# Patient Record
Sex: FEMALE | Race: WHITE | Employment: FULL TIME | URBAN - METROPOLITAN AREA
[De-identification: names, ages, dates, MRNs, and addresses within clinical notes are randomized per-mention and may not be internally consistent; named-entity substitution may affect disease eponyms.]

---

## 2018-01-02 PROBLEM — O41.00X0 OLIGOHYDRAMNIOS: Status: ACTIVE | Noted: 2018-01-02

## 2022-03-19 PROBLEM — O41.00X0 OLIGOHYDRAMNIOS: Status: ACTIVE | Noted: 2018-01-02

## 2022-12-16 ENCOUNTER — OFFICE VISIT (OUTPATIENT)
Dept: FAMILY MEDICINE CLINIC | Age: 27
End: 2022-12-16
Payer: COMMERCIAL

## 2022-12-16 VITALS
HEIGHT: 62 IN | WEIGHT: 133 LBS | SYSTOLIC BLOOD PRESSURE: 111 MMHG | BODY MASS INDEX: 24.48 KG/M2 | RESPIRATION RATE: 18 BRPM | DIASTOLIC BLOOD PRESSURE: 69 MMHG | TEMPERATURE: 97.4 F | HEART RATE: 74 BPM | OXYGEN SATURATION: 98 %

## 2022-12-16 DIAGNOSIS — Z00.00 WELL WOMAN EXAM (NO GYNECOLOGICAL EXAM): Primary | ICD-10-CM

## 2022-12-16 DIAGNOSIS — Z11.59 NEED FOR HEPATITIS C SCREENING TEST: ICD-10-CM

## 2022-12-16 NOTE — PATIENT INSTRUCTIONS
Vaccine Information Statement    Influenza (Flu) Vaccine (Inactivated or Recombinant): What You Need to Know    Many vaccine information statements are available in Polish and other languages. See www.immunize.org/vis. Hojas de información sobre vacunas están disponibles en español y en muchos otros idiomas. Visite www.immunize.org/vis. 1. Why get vaccinated? Influenza vaccine can prevent influenza (flu). Flu is a contagious disease that spreads around the [facility] every year, usually between October and May. Anyone can get the flu, but it is more dangerous for some people. Infants and young children, people 72 years and older, pregnant people, and people with certain health conditions or a weakened immune system are at greatest risk of flu complications. Pneumonia, bronchitis, sinus infections, and ear infections are examples of flu-related complications. If you have a medical condition, such as heart disease, cancer, or diabetes, flu can make it worse. Flu can cause fever and chills, sore throat, muscle aches, fatigue, cough, headache, and runny or stuffy nose. Some people may have vomiting and diarrhea, though this is more common in children than adults. In an average year, thousands of people in the [facility] die from flu, and many more are hospitalized. Flu vaccine prevents millions of illnesses and flu-related visits to the doctor each year. 2. Influenza vaccines     CDC recommends everyone 6 months and older get vaccinated every flu season. Children 6 months through 6years of age may need 2 doses during a single flu season. Everyone else needs only 1 dose each flu season. It takes about 2 weeks for protection to develop after vaccination. There are many flu viruses, and they are always changing. Each year a new flu vaccine is made to protect against the influenza viruses believed to be likely to cause disease in the upcoming flu season.  Even when the vaccine doesnt exactly match these viruses, it may still provide some protection. Influenza vaccine does not cause flu. Influenza vaccine may be given at the same time as other vaccines. 3. Talk with your health care provider    Tell your vaccination provider if the person getting the vaccine:  Has had an allergic reaction after a previous dose of influenza vaccine, or has any severe, life-threatening allergies   Has ever had Guillain-Barré Syndrome (also called GBS)    In some cases, your health care provider may decide to postpone influenza vaccination until a future visit. Influenza vaccine can be administered at any time during pregnancy. People who are or will be pregnant during influenza season should receive inactivated influenza vaccine. People with minor illnesses, such as a cold, may be vaccinated. People who are moderately or severely ill should usually wait until they recover before getting influenza vaccine. Your health care provider can give you more information. 4. Risks of a vaccine reaction    Soreness, redness, and swelling where the shot is given, fever, muscle aches, and headache can happen after influenza vaccination. There may be a very small increased risk of Guillain-Barré Syndrome (GBS) after inactivated influenza vaccine (the flu shot). William Calero children who get the flu shot along with pneumococcal vaccine (PCV13) and/or DTaP vaccine at the same time might be slightly more likely to have a seizure caused by fever. Tell your health care provider if a child who is getting flu vaccine has ever had a seizure. People sometimes faint after medical procedures, including vaccination. Tell your provider if you feel dizzy or have vision changes or ringing in the ears. As with any medicine, there is a very remote chance of a vaccine causing a severe allergic reaction, other serious injury, or death. 5. What if there is a serious problem?     An allergic reaction could occur after the vaccinated person leaves the clinic. If you see signs of a severe allergic reaction (hives, swelling of the face and throat, difficulty breathing, a fast heartbeat, dizziness, or weakness), call 9-1-1 and get the person to the nearest hospital.    For other signs that concern you, call your health care provider. Adverse reactions should be reported to the Vaccine Adverse Event Reporting System (VAERS). Your health care provider will usually file this report, or you can do it yourself. Visit the VAERS website at www.vaers. [facility].gov or call 1-619.279.5561. VAERS is only for reporting reactions, and VAERS staff members do not give medical advice. 6. The National Vaccine Injury Compensation Program    The [facility] Vaccine Injury Compensation Program (VICP) is a federal program that was created to compensate people who may have been injured by certain vaccines. Claims regarding alleged injury or death due to vaccination have a time limit for filing, which may be as short as two years. Visit the VICP website at www.[facility] [[facility]]a.gov/vaccinecompensation or call 9-165.541.4011 to learn about the program and about filing a claim. 7. How can I learn more? Ask your health care provider. Call your local or state health department. Visit the website of the Food and Drug Administration (FDA) for vaccine package inserts and additional information at www.fda.gov/vaccines-blood-biologics/vaccines. Contact the Centers for Disease Control and Prevention (CDC): Call 1-433.179.2278 (1-800-CDC-INFO) or  Visit CDCs influenza website at www.cdc.gov/flu. Vaccine Information Statement   Inactivated Influenza Vaccine   8/6/2021  42 TYRLE Delatorre 407ZY-90     Department of Health and Human Services  Centers for Disease Control and Prevention    Office Use Only

## 2022-12-16 NOTE — PROGRESS NOTES
1. \"Have you been to the ER, urgent care clinic since your last visit? Hospitalized since your last visit? \" Yes , June 4 2022 for labour    2. \"Have you seen or consulted any other health care providers outside of the [address] [place] [address] since your last visit? \" Yes , obgyn [facility] Dr Blu Amin      3. For patients aged 39-70: Has the patient had a colonoscopy / FIT/ Cologuard? NA - based on age      If the patient is female:    4. For patients aged 41-77: Has the patient had a mammogram within the past 2 years? NA - based on age or sex      11. For patients aged 21-65: Has the patient had a pap smear?  Yes - no Care Gap present

## 2022-12-16 NOTE — PROGRESS NOTES
Subjective:     Phillip is a 28 y.o. WHITE/NON- female who complains of   Chief Complaint   Patient presents with    Well Woman     She is here today for a routine physical exam without gyn and without concerns. Declines covid and flu    She is under the care of gyn last pap 2 years ago, will obtain records and if abnormal send to Dr. Kaylen Phelan if not I will take over care until needed. She is  ages 3, 7 months and 1years of age. She is breast feeding. She quit job 3 months ago. Send in ref for urology for . History reviewed. No pertinent past medical history. Past Surgical History:   Procedure Laterality Date    HX  SECTION  2018    and dec 2018    HX OOPHORECTOMY Right      Social History     Socioeconomic History    Marital status: LEGALLY    Tobacco Use    Smoking status: Former    Smokeless tobacco: Never   Vaping Use    Vaping Use: Never used   Substance and Sexual Activity    Alcohol use: No    Drug use: Yes     Types: Marijuana     Current Outpatient Medications   Medication Sig Dispense Refill    PNV No12-Iron-FA-DSS-OM-3 29 mg iron-1 mg -50 mg CPKD Take  by mouth. Allergies   Allergen Reactions    Sulfa (Sulfonamide Antibiotics) Nausea and Vomiting     The patient has a family history of  Family History   Problem Relation Age of Onset    Suicide Mother     No Known Problems Father        REVIEW OF SYSTEMS  Review of Systems   All other systems reviewed and are negative. Objective:     Visit Vitals  /69 (BP 1 Location: Right arm, BP Patient Position: Sitting, BP Cuff Size: Adult)   Pulse 74   Temp 97.4 °F (36.3 °C) (Temporal)   Resp 18   Ht 5' 2\" (1.575 m)   Wt 133 lb (60.3 kg)   LMP 2022   SpO2 98%   Breastfeeding Yes   BMI 24.33 kg/m²       PHYSICAL EXAM  Physical Exam  Vitals reviewed. Constitutional:       Appearance: Normal appearance. She is normal weight. HENT:      Head: Normocephalic and atraumatic.       Right Ear: Tympanic membrane normal.      Left Ear: Tympanic membrane normal.      Nose: Nose normal.      Mouth/Throat:      Mouth: Mucous membranes are moist.      Pharynx: Oropharynx is clear. Eyes:      Extraocular Movements: Extraocular movements intact. Conjunctiva/sclera: Conjunctivae normal.      Pupils: Pupils are equal, round, and reactive to light. Cardiovascular:      Rate and Rhythm: Normal rate and regular rhythm. Pulses: Normal pulses. Heart sounds: Normal heart sounds. Pulmonary:      Effort: Pulmonary effort is normal.      Breath sounds: Normal breath sounds. Abdominal:      General: Abdomen is flat. Bowel sounds are normal.      Palpations: Abdomen is soft. Musculoskeletal:         General: Normal range of motion. Cervical back: Normal range of motion and neck supple. Skin:     General: Skin is warm and dry. Capillary Refill: Capillary refill takes less than 2 seconds. Neurological:      General: No focal deficit present. Mental Status: She is alert and oriented to person, place, and time. Mental status is at baseline. Psychiatric:         Mood and Affect: Mood normal.         Behavior: Behavior normal.         Thought Content:  Thought content normal.         Judgment: Judgment normal.       Lab Results   Component Value Date/Time    WBC 15.5 (H) 01/03/2018 12:42 AM    HGB 9.9 (L) 01/03/2018 12:42 AM    HCT 29.5 (L) 01/03/2018 12:42 AM    PLATELET 068 41/34/2057 12:42 AM    MCV 86.3 01/03/2018 12:42 AM     Lab Results   Component Value Date/Time    Glucose 73 (L) 01/02/2018 05:24 PM    Creatinine 0.7 01/02/2018 05:24 PM      No results found for: CHOL, CHOLPOCT, HDL, LDL, LDLC, LDLCPOC, LDLCEXT, TRIGL, TGLPOCT, CHHD, CHHDX  Lab Results   Component Value Date/Time    PLATELET 081 75/54/5105 12:42 AM       Lab Results   Component Value Date/Time    GFR est non-AA >60 01/02/2018 05:24 PM    GFR est AA >60.0 01/02/2018 05:24 PM    Creatinine 0.7 01/02/2018 05:24 PM    BUN 11 01/02/2018 05:24 PM    Sodium 137 01/02/2018 05:24 PM    Potassium 3.6 01/02/2018 05:24 PM    Chloride 107 01/02/2018 05:24 PM    CO2 21 01/02/2018 05:24 PM     No results found for: PSA, PSA2, PSAR1, PSA1, PSAR2, PSA3, PSAR3, [id number], [id number]  No results found for: TSH, TSH2, TSH3, TSHP, TSHELE, TSHEXT, TT3, T3U, T3UP, FRT3, FT3, FT4, FT4P, T4, T4P, FT4T, TT7, TSHEXT, TSHEXT   Lab Results   Component Value Date/Time    Glucose 73 (L) 01/02/2018 05:24 PM      Lab Results   Component Value Date/Time    Sodium 137 01/02/2018 05:24 PM    Potassium 3.6 01/02/2018 05:24 PM    Chloride 107 01/02/2018 05:24 PM    CO2 21 01/02/2018 05:24 PM    Glucose 73 (L) 01/02/2018 05:24 PM    BUN 11 01/02/2018 05:24 PM    Creatinine 0.7 01/02/2018 05:24 PM    GFR est AA >60.0 01/02/2018 05:24 PM    GFR est non-AA >60 01/02/2018 05:24 PM    Calcium 8.5 01/02/2018 05:24 PM      Lab Results   Component Value Date/Time    Sodium 137 01/02/2018 05:24 PM    Potassium 3.6 01/02/2018 05:24 PM    Chloride 107 01/02/2018 05:24 PM    CO2 21 01/02/2018 05:24 PM    Glucose 73 (L) 01/02/2018 05:24 PM    BUN 11 01/02/2018 05:24 PM    Creatinine 0.7 01/02/2018 05:24 PM    GFR est AA >60.0 01/02/2018 05:24 PM    GFR est non-AA >60 01/02/2018 05:24 PM    Calcium 8.5 01/02/2018 05:24 PM      No results found for: HBA1C, [id number], TQZ8LQOI, KIZ8AOCH, DIO3FJGN :3      Assessment/Plan:     1. Well woman exam (no gynecological exam)    - CBC WITH AUTOMATED DIFF; Future  - METABOLIC PANEL, COMPREHENSIVE; Future  - TSH 3RD GENERATION; Future  - URINALYSIS W/MICROSCOPIC; Future    2. Need for hepatitis C screening test    - HEPATITIS C AB; Future      Follow-up and Dispositions    Return in about 1 year (around 12/16/2023) for routine physical exam without gyn. Disclaimer: The patient understands our medical plan. Alternatives have been explained and offered.   The risks, benefits and significant side effects of all medications have been reviewed. Anticipated time course and progression of condition reviewed. All questions have been addressed. She is encouraged to employ the information provided in the after visit summary, which was reviewed. Where applicable, she is instructed to call the clinic if she has not been notified either by phone or through [address] with the results of her tests or with an appointment plan for any referrals within 1 week(s). The patient is to call if her condition worsens or fails to improve or if significant side effects are experienced. Aspects of this note may have been generated using voice recognition software. Despite editing, there may be unrecognized errors. condition worsens or fails to improve or if significant side effects are experienced. Please note that this dictation was completed with [employer], the computer voice recognition software. Quite often unanticipated grammatical, syntax, homophones, and other interpretive errors are inadvertently transcribed by the computer software. Please disregard these errors. Please excuse any errors that have escaped final proofreading.     Kevin Cunningham NP      12/16/2022

## 2022-12-17 LAB
ALBUMIN SERPL-MCNC: 5 G/DL (ref 3.9–5)
ALBUMIN/GLOB SERPL: 2.3 {RATIO} (ref 1.2–2.2)
ALP SERPL-CCNC: 82 IU/L (ref 44–121)
ALT SERPL-CCNC: 14 IU/L (ref 0–32)
APPEARANCE UR: CLEAR
AST SERPL-CCNC: 18 IU/L (ref 0–40)
BACTERIA #/AREA URNS HPF: NORMAL /[HPF]
BASOPHILS # BLD AUTO: 0.1 X10E3/UL (ref 0–0.2)
BASOPHILS NFR BLD AUTO: 1 %
BILIRUB SERPL-MCNC: 0.3 MG/DL (ref 0–1.2)
BILIRUB UR QL STRIP: NEGATIVE
BUN SERPL-MCNC: 13 MG/DL (ref 6–20)
BUN/CREAT SERPL: 18 (ref 9–23)
CALCIUM SERPL-MCNC: 9.8 MG/DL (ref 8.7–10.2)
CASTS URNS QL MICRO: NORMAL /LPF
CHLORIDE SERPL-SCNC: 100 MMOL/L (ref 96–106)
CO2 SERPL-SCNC: 23 MMOL/L (ref 20–29)
COLOR UR: YELLOW
CREAT SERPL-MCNC: 0.74 MG/DL (ref 0.57–1)
EGFR: 113 ML/MIN/1.73
EOSINOPHIL # BLD AUTO: 0.3 X10E3/UL (ref 0–0.4)
EOSINOPHIL NFR BLD AUTO: 4 %
EPI CELLS #/AREA URNS HPF: NORMAL /HPF (ref 0–10)
ERYTHROCYTE [DISTWIDTH] IN BLOOD BY AUTOMATED COUNT: 12.6 % (ref 11.7–15.4)
GLOBULIN SER CALC-MCNC: 2.2 G/DL (ref 1.5–4.5)
GLUCOSE SERPL-MCNC: 81 MG/DL (ref 70–99)
GLUCOSE UR QL STRIP: NEGATIVE
HCT VFR BLD AUTO: 43.4 % (ref 34–46.6)
HCV AB S/CO SERPL IA: <0.1 S/CO RATIO (ref 0–0.9)
HGB BLD-MCNC: 14.4 G/DL (ref 11.1–15.9)
HGB UR QL STRIP: NEGATIVE
IMM GRANULOCYTES # BLD AUTO: 0 X10E3/UL (ref 0–0.1)
IMM GRANULOCYTES NFR BLD AUTO: 0 %
KETONES UR QL STRIP: NEGATIVE
LEUKOCYTE ESTERASE UR QL STRIP: NEGATIVE
LYMPHOCYTES # BLD AUTO: 2.7 X10E3/UL (ref 0.7–3.1)
LYMPHOCYTES NFR BLD AUTO: 36 %
MCH RBC QN AUTO: 29.4 PG (ref 26.6–33)
MCHC RBC AUTO-ENTMCNC: 33.2 G/DL (ref 31.5–35.7)
MCV RBC AUTO: 89 FL (ref 79–97)
MICRO URNS: ABNORMAL
MICRO URNS: ABNORMAL
MONOCYTES # BLD AUTO: 0.6 X10E3/UL (ref 0.1–0.9)
MONOCYTES NFR BLD AUTO: 8 %
NEUTROPHILS # BLD AUTO: 4 X10E3/UL (ref 1.4–7)
NEUTROPHILS NFR BLD AUTO: 51 %
NITRITE UR QL STRIP: NEGATIVE
PH UR STRIP: 6 [PH] (ref 5–7.5)
PLATELET # BLD AUTO: 276 X10E3/UL (ref 150–450)
POTASSIUM SERPL-SCNC: 4.1 MMOL/L (ref 3.5–5.2)
PROT SERPL-MCNC: 7.2 G/DL (ref 6–8.5)
PROT UR QL STRIP: NEGATIVE
RBC # BLD AUTO: 4.9 X10E6/UL (ref 3.77–5.28)
RBC #/AREA URNS HPF: NORMAL /HPF (ref 0–2)
SODIUM SERPL-SCNC: 137 MMOL/L (ref 134–144)
SP GR UR STRIP: >=1.03 (ref 1–1.03)
TSH SERPL DL<=0.005 MIU/L-ACNC: 0.99 UIU/ML (ref 0.45–4.5)
UROBILINOGEN UR STRIP-MCNC: 0.2 MG/DL (ref 0.2–1)
WBC # BLD AUTO: 7.6 X10E3/UL (ref 3.4–10.8)
WBC #/AREA URNS HPF: NORMAL /HPF (ref 0–5)

## 2022-12-21 ENCOUNTER — TELEPHONE (OUTPATIENT)
Dept: FAMILY MEDICINE CLINIC | Age: 27
End: 2022-12-21

## 2022-12-21 NOTE — TELEPHONE ENCOUNTER
----- Message from Janis Said, NP sent at 12/19/2022  6:27 PM EST -----  Labs look good.  Need to increase your water intake!!!

## 2022-12-21 NOTE — TELEPHONE ENCOUNTER
Spoke w patient regarding lab results. She wanted to know based off results from her last pap smear did she need to get one ?, I did inform her once you review the results we would give her a call confirming if she needed one.

## 2023-10-06 ENCOUNTER — TELEPHONE (OUTPATIENT)
Age: 28
End: 2023-10-06

## 2023-10-06 ENCOUNTER — HOSPITAL ENCOUNTER (OUTPATIENT)
Facility: HOSPITAL | Age: 28
Discharge: HOME | End: 2023-10-06
Payer: COMMERCIAL

## 2023-10-06 ENCOUNTER — OFFICE VISIT (OUTPATIENT)
Age: 28
End: 2023-10-06
Payer: COMMERCIAL

## 2023-10-06 VITALS
BODY MASS INDEX: 22.34 KG/M2 | OXYGEN SATURATION: 99 % | RESPIRATION RATE: 17 BRPM | HEART RATE: 77 BPM | SYSTOLIC BLOOD PRESSURE: 96 MMHG | DIASTOLIC BLOOD PRESSURE: 71 MMHG | WEIGHT: 121.4 LBS | TEMPERATURE: 97.5 F | HEIGHT: 62 IN

## 2023-10-06 DIAGNOSIS — N30.90 CYSTITIS: ICD-10-CM

## 2023-10-06 DIAGNOSIS — R35.0 URINE FREQUENCY: ICD-10-CM

## 2023-10-06 DIAGNOSIS — R35.0 URINE FREQUENCY: Primary | ICD-10-CM

## 2023-10-06 LAB
BILIRUBIN, URINE, POC: NEGATIVE
BLOOD URINE, POC: ABNORMAL
GLUCOSE URINE, POC: NEGATIVE
KETONES, URINE, POC: NEGATIVE
LEUKOCYTE ESTERASE, URINE, POC: ABNORMAL
NITRITE, URINE, POC: NEGATIVE
PH, URINE, POC: 6 (ref 4.6–8)
PROTEIN,URINE, POC: ABNORMAL
SPECIFIC GRAVITY, URINE, POC: 1.01 (ref 1–1.03)
URINALYSIS CLARITY, POC: CLEAR
URINALYSIS COLOR, POC: ABNORMAL
UROBILINOGEN, POC: NORMAL

## 2023-10-06 PROCEDURE — 81001 URINALYSIS AUTO W/SCOPE: CPT | Performed by: NURSE PRACTITIONER

## 2023-10-06 PROCEDURE — 99213 OFFICE O/P EST LOW 20 MIN: CPT | Performed by: NURSE PRACTITIONER

## 2023-10-06 PROCEDURE — 87186 SC STD MICRODIL/AGAR DIL: CPT

## 2023-10-06 PROCEDURE — 87077 CULTURE AEROBIC IDENTIFY: CPT

## 2023-10-06 PROCEDURE — 87086 URINE CULTURE/COLONY COUNT: CPT

## 2023-10-06 RX ORDER — CEPHALEXIN 500 MG/1
500 CAPSULE ORAL 3 TIMES DAILY
Qty: 9 CAPSULE | Refills: 0 | Status: SHIPPED | OUTPATIENT
Start: 2023-10-06 | End: 2023-10-09

## 2023-10-06 SDOH — ECONOMIC STABILITY: TRANSPORTATION INSECURITY
IN THE PAST 12 MONTHS, HAS LACK OF TRANSPORTATION KEPT YOU FROM MEETINGS, WORK, OR FROM GETTING THINGS NEEDED FOR DAILY LIVING?: NO

## 2023-10-06 SDOH — ECONOMIC STABILITY: FOOD INSECURITY: WITHIN THE PAST 12 MONTHS, THE FOOD YOU BOUGHT JUST DIDN'T LAST AND YOU DIDN'T HAVE MONEY TO GET MORE.: PATIENT DECLINED

## 2023-10-06 SDOH — ECONOMIC STABILITY: FOOD INSECURITY: WITHIN THE PAST 12 MONTHS, YOU WORRIED THAT YOUR FOOD WOULD RUN OUT BEFORE YOU GOT MONEY TO BUY MORE.: PATIENT DECLINED

## 2023-10-06 SDOH — ECONOMIC STABILITY: HOUSING INSECURITY
IN THE LAST 12 MONTHS, WAS THERE A TIME WHEN YOU DID NOT HAVE A STEADY PLACE TO SLEEP OR SLEPT IN A SHELTER (INCLUDING NOW)?: NO

## 2023-10-06 SDOH — ECONOMIC STABILITY: INCOME INSECURITY: HOW HARD IS IT FOR YOU TO PAY FOR THE VERY BASICS LIKE FOOD, HOUSING, MEDICAL CARE, AND HEATING?: SOMEWHAT HARD

## 2023-10-06 ASSESSMENT — ENCOUNTER SYMPTOMS
VOMITING: 0
COUGH: 0
SHORTNESS OF BREATH: 0
BACK PAIN: 1
NAUSEA: 0
CHEST TIGHTNESS: 0
ABDOMINAL PAIN: 0

## 2023-10-06 ASSESSMENT — PATIENT HEALTH QUESTIONNAIRE - PHQ9
2. FEELING DOWN, DEPRESSED OR HOPELESS: 0
SUM OF ALL RESPONSES TO PHQ QUESTIONS 1-9: 0
SUM OF ALL RESPONSES TO PHQ QUESTIONS 1-9: 0
SUM OF ALL RESPONSES TO PHQ9 QUESTIONS 1 & 2: 0
SUM OF ALL RESPONSES TO PHQ QUESTIONS 1-9: 0
SUM OF ALL RESPONSES TO PHQ QUESTIONS 1-9: 0
1. LITTLE INTEREST OR PLEASURE IN DOING THINGS: 0

## 2023-10-06 NOTE — PROGRESS NOTES
Aletha Mcdowell (:  1995) is a 33 y.o. female, here for evaluation of the following medical concerns:  Chief Complaint   Patient presents with    Urinary Tract Infection     Sx: burning while urinating, freq urination, and kidney pain. ASSESSMENT/PLAN:  1. Urine frequency    - AMB POC URINALYSIS DIP STICK AUTO W/ MICRO  - Culture, Urine; Future  - cephALEXin (KEFLEX) 500 MG capsule; Take 1 capsule by mouth 3 times daily for 3 days  Dispense: 9 capsule; Refill: 0    2. Cystitis    - cephALEXin (KEFLEX) 500 MG capsule; Take 1 capsule by mouth 3 times daily for 3 days  Dispense: 9 capsule; Refill: 0      Return if symptoms worsen or fail to improve. Urinary Tract Infection      She is here today for urinary frequency, burning with urination, and kidney pain bilateral worse to left and center. She is on menses, she is breast feeding. Sent for culture will treat with Keflex. Review of Systems   Constitutional:  Negative for diaphoresis and fatigue. Respiratory:  Negative for cough, chest tightness and shortness of breath. Cardiovascular:  Negative for chest pain, palpitations and leg swelling. Gastrointestinal:  Negative for abdominal pain, nausea and vomiting. Genitourinary:  Positive for dysuria and frequency. Musculoskeletal:  Positive for back pain. Neurological:  Negative for dizziness, weakness and headaches. All other systems reviewed and are negative. Prior to Visit Medications    Medication Sig Taking?  Authorizing Provider   Prenatal Vit-Fe Fumarate-FA (PRENATAL PO) Take 1 tablet by mouth every other day Yes Provider, MD Cirilo   cephALEXin (KEFLEX) 500 MG capsule Take 1 capsule by mouth 3 times daily for 3 days Yes MAURICIO Huertas NP        Social History     Tobacco Use    Smoking status: Former     Passive exposure: Never    Smokeless tobacco: Never   Substance Use Topics    Alcohol use: No        BP 96/71 (Site: Left Upper Arm,

## 2023-10-06 NOTE — PROGRESS NOTES
1. \"Have you been to the ER, urgent care clinic since your last visit? Hospitalized since your last visit? \" No    2. \"Have you seen or consulted any other health care providers outside of the [address] [place] [address] since your last visit? \" Yes obgyn      3. For patients aged 43-73: Has the patient had a colonoscopy / FIT/ Cologuard? NA - based on age      If the patient is female:    4. For patients aged 43-66: Has the patient had a mammogram within the past 2 years? NA - based on age or sex      11. For patients aged 21-65: Has the patient had a pap smear?  Yes - no Care Gap present

## 2023-10-08 LAB
BACTERIA SPEC CULT: ABNORMAL
CC UR VC: ABNORMAL
SERVICE CMNT-IMP: ABNORMAL

## 2023-10-09 LAB
BACTERIA SPEC CULT: ABNORMAL
CC UR VC: ABNORMAL
SERVICE CMNT-IMP: ABNORMAL

## 2024-01-27 ENCOUNTER — HOSPITAL ENCOUNTER (OUTPATIENT)
Facility: HOSPITAL | Age: 29
Discharge: HOME | End: 2024-01-30

## 2024-01-27 DIAGNOSIS — Z00.00 ANNUAL PHYSICAL EXAM: ICD-10-CM

## 2024-01-27 LAB — LABCORP SPECIMEN COLLECTION: NORMAL

## 2024-01-28 LAB
BASOPHILS # BLD AUTO: 0.1 X10E3/UL (ref 0–0.2)
BASOPHILS NFR BLD AUTO: 1 %
EOSINOPHIL # BLD AUTO: 0.2 X10E3/UL (ref 0–0.4)
EOSINOPHIL NFR BLD AUTO: 3 %
ERYTHROCYTE [DISTWIDTH] IN BLOOD BY AUTOMATED COUNT: 12.3 % (ref 11.7–15.4)
HCT VFR BLD AUTO: 42.5 % (ref 34–46.6)
HGB BLD-MCNC: 13.8 G/DL (ref 11.1–15.9)
IMM GRANULOCYTES # BLD AUTO: 0 X10E3/UL (ref 0–0.1)
IMM GRANULOCYTES NFR BLD AUTO: 0 %
LYMPHOCYTES # BLD AUTO: 2.3 X10E3/UL (ref 0.7–3.1)
LYMPHOCYTES NFR BLD AUTO: 34 %
MCH RBC QN AUTO: 29.4 PG (ref 26.6–33)
MCHC RBC AUTO-ENTMCNC: 32.5 G/DL (ref 31.5–35.7)
MCV RBC AUTO: 91 FL (ref 79–97)
MONOCYTES # BLD AUTO: 0.5 X10E3/UL (ref 0.1–0.9)
MONOCYTES NFR BLD AUTO: 8 %
NEUTROPHILS # BLD AUTO: 3.6 X10E3/UL (ref 1.4–7)
NEUTROPHILS NFR BLD AUTO: 54 %
PLATELET # BLD AUTO: 240 X10E3/UL (ref 150–450)
RBC # BLD AUTO: 4.69 X10E6/UL (ref 3.77–5.28)
SPECIMEN STATUS REPORT: NORMAL
TSH SERPL DL<=0.005 MIU/L-ACNC: 0.73 UIU/ML (ref 0.45–4.5)
WBC # BLD AUTO: 6.7 X10E3/UL (ref 3.4–10.8)

## 2024-01-29 LAB
ALBUMIN SERPL-MCNC: 4.8 G/DL (ref 4–5)
ALBUMIN/GLOB SERPL: 2.3 {RATIO} (ref 1.2–2.2)
ALP SERPL-CCNC: 58 IU/L (ref 44–121)
ALT SERPL-CCNC: 16 IU/L (ref 0–32)
APPEARANCE UR: ABNORMAL
AST SERPL-CCNC: 21 IU/L (ref 0–40)
BACTERIA #/AREA URNS HPF: ABNORMAL /[HPF]
BILIRUB SERPL-MCNC: 0.3 MG/DL (ref 0–1.2)
BILIRUB UR QL STRIP: NEGATIVE
BUN SERPL-MCNC: 12 MG/DL (ref 6–20)
BUN/CREAT SERPL: 13 (ref 9–23)
CALCIUM SERPL-MCNC: 9.7 MG/DL (ref 8.7–10.2)
CASTS URNS QL MICRO: ABNORMAL /LPF
CHLORIDE SERPL-SCNC: 103 MMOL/L (ref 96–106)
CHOLEST SERPL-MCNC: 165 MG/DL (ref 100–199)
CO2 SERPL-SCNC: 20 MMOL/L (ref 20–29)
COLOR UR: YELLOW
CREAT SERPL-MCNC: 0.89 MG/DL (ref 0.57–1)
CRYSTALS URNS MICRO: ABNORMAL
EGFRCR SERPLBLD CKD-EPI 2021: 89 ML/MIN/1.73
EPI CELLS #/AREA URNS HPF: ABNORMAL /HPF (ref 0–10)
GLOBULIN SER CALC-MCNC: 2.1 G/DL (ref 1.5–4.5)
GLUCOSE SERPL-MCNC: 86 MG/DL (ref 70–99)
GLUCOSE UR QL STRIP: NEGATIVE
HBA1C MFR BLD: 5.2 % (ref 4.8–5.6)
HDLC SERPL-MCNC: 79 MG/DL
HGB UR QL STRIP: NEGATIVE
KETONES UR QL STRIP: NEGATIVE
LDLC SERPL CALC-MCNC: 75 MG/DL (ref 0–99)
LEUKOCYTE ESTERASE UR QL STRIP: NEGATIVE
MICRO URNS: ABNORMAL
MICRO URNS: ABNORMAL
MUCOUS THREADS URNS QL MICRO: PRESENT
NITRITE UR QL STRIP: NEGATIVE
PH UR STRIP: 7.5 [PH] (ref 5–7.5)
POTASSIUM SERPL-SCNC: 4.4 MMOL/L (ref 3.5–5.2)
PROT SERPL-MCNC: 6.9 G/DL (ref 6–8.5)
PROT UR QL STRIP: NEGATIVE
RBC #/AREA URNS HPF: ABNORMAL /HPF (ref 0–2)
SODIUM SERPL-SCNC: 141 MMOL/L (ref 134–144)
SP GR UR STRIP: 1.02 (ref 1–1.03)
TRIGL SERPL-MCNC: 52 MG/DL (ref 0–149)
UNIDENT CRYS URNS QL MICRO: PRESENT
UROBILINOGEN UR STRIP-MCNC: 0.2 MG/DL (ref 0.2–1)
VLDLC SERPL CALC-MCNC: 11 MG/DL (ref 5–40)
WBC #/AREA URNS HPF: ABNORMAL /HPF (ref 0–5)

## 2024-06-08 ENCOUNTER — HOSPITAL ENCOUNTER (OUTPATIENT)
Facility: HOSPITAL | Age: 29
Discharge: HOME | End: 2024-06-11
Payer: COMMERCIAL

## 2024-06-08 DIAGNOSIS — L65.9 HAIR LOSS: ICD-10-CM

## 2024-06-08 DIAGNOSIS — N92.6 IRREGULAR PERIODS: ICD-10-CM

## 2024-06-08 DIAGNOSIS — N92.1 MENORRHAGIA WITH IRREGULAR CYCLE: ICD-10-CM

## 2024-06-08 DIAGNOSIS — L65.9 HAIR THINNING: ICD-10-CM

## 2024-06-08 LAB
BASOPHILS # BLD: 0.1 K/UL (ref 0–0.1)
BASOPHILS NFR BLD: 1 % (ref 0–2)
DIFFERENTIAL METHOD BLD: ABNORMAL
EOSINOPHIL # BLD: 0.2 K/UL (ref 0–0.4)
EOSINOPHIL NFR BLD: 3 % (ref 0–5)
ERYTHROCYTE [DISTWIDTH] IN BLOOD BY AUTOMATED COUNT: 12.8 % (ref 11.6–14.5)
FERRITIN SERPL-MCNC: 11 NG/ML (ref 8–388)
HCT VFR BLD AUTO: 42.6 % (ref 35–45)
HGB BLD-MCNC: 13.7 G/DL (ref 12–16)
IMM GRANULOCYTES # BLD AUTO: 0 K/UL (ref 0–0.04)
IMM GRANULOCYTES NFR BLD AUTO: 0 % (ref 0–0.5)
IRON SATN MFR SERPL: 15 % (ref 20–50)
IRON SERPL-MCNC: 51 UG/DL (ref 50–175)
LYMPHOCYTES # BLD: 1.4 K/UL (ref 0.9–3.6)
LYMPHOCYTES NFR BLD: 19 % (ref 21–52)
MCH RBC QN AUTO: 30.2 PG (ref 24–34)
MCHC RBC AUTO-ENTMCNC: 32.2 G/DL (ref 31–37)
MCV RBC AUTO: 93.8 FL (ref 78–100)
MONOCYTES # BLD: 0.5 K/UL (ref 0.05–1.2)
MONOCYTES NFR BLD: 7 % (ref 3–10)
NEUTS SEG # BLD: 4.9 K/UL (ref 1.8–8)
NEUTS SEG NFR BLD: 70 % (ref 40–73)
NRBC # BLD: 0 K/UL (ref 0–0.01)
NRBC BLD-RTO: 0 PER 100 WBC
PLATELET # BLD AUTO: 252 K/UL (ref 135–420)
PMV BLD AUTO: 10.5 FL (ref 9.2–11.8)
RBC # BLD AUTO: 4.54 M/UL (ref 4.2–5.3)
T4 FREE SERPL-MCNC: 0.9 NG/DL (ref 0.7–1.5)
TIBC SERPL-MCNC: 341 UG/DL (ref 250–450)
TSH SERPL DL<=0.05 MIU/L-ACNC: 1.22 UIU/ML (ref 0.36–3.74)
WBC # BLD AUTO: 7.1 K/UL (ref 4.6–13.2)

## 2024-06-08 PROCEDURE — 84443 ASSAY THYROID STIM HORMONE: CPT

## 2024-06-08 PROCEDURE — 83001 ASSAY OF GONADOTROPIN (FSH): CPT

## 2024-06-08 PROCEDURE — 83540 ASSAY OF IRON: CPT

## 2024-06-08 PROCEDURE — 82728 ASSAY OF FERRITIN: CPT

## 2024-06-08 PROCEDURE — 36415 COLL VENOUS BLD VENIPUNCTURE: CPT

## 2024-06-08 PROCEDURE — 85025 COMPLETE CBC W/AUTO DIFF WBC: CPT

## 2024-06-08 PROCEDURE — 84480 ASSAY TRIIODOTHYRONINE (T3): CPT

## 2024-06-08 PROCEDURE — 84439 ASSAY OF FREE THYROXINE: CPT

## 2024-06-08 PROCEDURE — 83550 IRON BINDING TEST: CPT

## 2024-06-08 PROCEDURE — 83002 ASSAY OF GONADOTROPIN (LH): CPT

## 2024-06-09 LAB
FSH SERPL-ACNC: 6.6 MIU/ML
LH SERPL-ACNC: 8.7 MIU/ML

## 2024-06-11 LAB — T3 SERPL-MCNC: 99 NG/DL (ref 71–180)

## 2024-12-16 ENCOUNTER — HOSPITAL ENCOUNTER (OUTPATIENT)
Facility: HOSPITAL | Age: 29
Discharge: HOME | End: 2024-12-19
Payer: COMMERCIAL

## 2024-12-16 ENCOUNTER — TELEPHONE (OUTPATIENT)
Facility: CLINIC | Age: 29
End: 2024-12-16

## 2024-12-16 DIAGNOSIS — Z00.00 ANNUAL PHYSICAL EXAM: ICD-10-CM

## 2024-12-16 DIAGNOSIS — Z00.00 ANNUAL PHYSICAL EXAM: Primary | ICD-10-CM

## 2024-12-16 LAB
ALBUMIN SERPL-MCNC: 4.2 G/DL (ref 3.4–5)
ALBUMIN/GLOB SERPL: 1.3 (ref 0.8–1.7)
ALP SERPL-CCNC: 53 U/L (ref 45–117)
ALT SERPL-CCNC: 23 U/L (ref 13–56)
ANION GAP SERPL CALC-SCNC: 2 MMOL/L (ref 3–18)
APPEARANCE UR: CLEAR
AST SERPL-CCNC: 16 U/L (ref 10–38)
BACTERIA URNS QL MICRO: ABNORMAL /HPF
BASOPHILS # BLD: 0 K/UL (ref 0–0.1)
BASOPHILS NFR BLD: 1 % (ref 0–2)
BILIRUB SERPL-MCNC: 0.8 MG/DL (ref 0.2–1)
BILIRUB UR QL: NEGATIVE
BUN SERPL-MCNC: 12 MG/DL (ref 7–18)
BUN/CREAT SERPL: 15 (ref 12–20)
CALCIUM SERPL-MCNC: 9.2 MG/DL (ref 8.5–10.1)
CHLORIDE SERPL-SCNC: 108 MMOL/L (ref 100–111)
CO2 SERPL-SCNC: 26 MMOL/L (ref 21–32)
COLOR UR: YELLOW
CREAT SERPL-MCNC: 0.82 MG/DL (ref 0.6–1.3)
DIFFERENTIAL METHOD BLD: ABNORMAL
EOSINOPHIL # BLD: 0.1 K/UL (ref 0–0.4)
EOSINOPHIL NFR BLD: 2 % (ref 0–5)
EPITH CASTS URNS QL MICRO: ABNORMAL /LPF (ref 0–5)
ERYTHROCYTE [DISTWIDTH] IN BLOOD BY AUTOMATED COUNT: 12.9 % (ref 11.6–14.5)
GLOBULIN SER CALC-MCNC: 3.2 G/DL (ref 2–4)
GLUCOSE SERPL-MCNC: 89 MG/DL (ref 74–99)
GLUCOSE UR STRIP.AUTO-MCNC: NEGATIVE MG/DL
HCT VFR BLD AUTO: 43.1 % (ref 35–45)
HGB BLD-MCNC: 14 G/DL (ref 12–16)
HGB UR QL STRIP: NEGATIVE
IMM GRANULOCYTES # BLD AUTO: 0 K/UL (ref 0–0.04)
IMM GRANULOCYTES NFR BLD AUTO: 0 % (ref 0–0.5)
KETONES UR QL STRIP.AUTO: NEGATIVE MG/DL
LEUKOCYTE ESTERASE UR QL STRIP.AUTO: ABNORMAL
LYMPHOCYTES # BLD: 1.9 K/UL (ref 0.9–3.6)
LYMPHOCYTES NFR BLD: 40 % (ref 21–52)
MCH RBC QN AUTO: 30.2 PG (ref 24–34)
MCHC RBC AUTO-ENTMCNC: 32.5 G/DL (ref 31–37)
MCV RBC AUTO: 92.9 FL (ref 78–100)
MONOCYTES # BLD: 0.6 K/UL (ref 0.05–1.2)
MONOCYTES NFR BLD: 14 % (ref 3–10)
NEUTS SEG # BLD: 2 K/UL (ref 1.8–8)
NEUTS SEG NFR BLD: 44 % (ref 40–73)
NITRITE UR QL STRIP.AUTO: NEGATIVE
NRBC # BLD: 0 K/UL (ref 0–0.01)
NRBC BLD-RTO: 0 PER 100 WBC
PH UR STRIP: 8 (ref 5–8)
PLATELET # BLD AUTO: 228 K/UL (ref 135–420)
PMV BLD AUTO: 9.9 FL (ref 9.2–11.8)
POTASSIUM SERPL-SCNC: 4.2 MMOL/L (ref 3.5–5.5)
PROT SERPL-MCNC: 7.4 G/DL (ref 6.4–8.2)
PROT UR STRIP-MCNC: NEGATIVE MG/DL
RBC # BLD AUTO: 4.64 M/UL (ref 4.2–5.3)
RBC #/AREA URNS HPF: ABNORMAL /HPF (ref 0–5)
SODIUM SERPL-SCNC: 136 MMOL/L (ref 136–145)
SP GR UR REFRACTOMETRY: 1.01 (ref 1–1.03)
UROBILINOGEN UR QL STRIP.AUTO: 0.2 EU/DL (ref 0.2–1)
WBC # BLD AUTO: 4.6 K/UL (ref 4.6–13.2)
WBC URNS QL MICRO: ABNORMAL /HPF (ref 0–5)

## 2024-12-16 PROCEDURE — 85025 COMPLETE CBC W/AUTO DIFF WBC: CPT

## 2024-12-16 PROCEDURE — 80053 COMPREHEN METABOLIC PANEL: CPT

## 2024-12-16 PROCEDURE — 36415 COLL VENOUS BLD VENIPUNCTURE: CPT

## 2024-12-16 PROCEDURE — 81001 URINALYSIS AUTO W/SCOPE: CPT

## 2024-12-16 NOTE — TELEPHONE ENCOUNTER
Patient called she went to [facility] this weekend to have her labs completed but they told her no labs were in her chart. She is going back to [facility] this morning and wants to know if provider can put the orders for her labs so they can have them when she arrives.

## 2024-12-24 ENCOUNTER — OFFICE VISIT (OUTPATIENT)
Facility: CLINIC | Age: 29
End: 2024-12-24

## 2024-12-24 VITALS
WEIGHT: 128 LBS | DIASTOLIC BLOOD PRESSURE: 74 MMHG | HEIGHT: 62 IN | TEMPERATURE: 97.4 F | HEART RATE: 78 BPM | SYSTOLIC BLOOD PRESSURE: 108 MMHG | OXYGEN SATURATION: 99 % | BODY MASS INDEX: 23.55 KG/M2

## 2024-12-24 DIAGNOSIS — Z00.00 ANNUAL PHYSICAL EXAM: Primary | ICD-10-CM

## 2024-12-24 DIAGNOSIS — R82.90 ABNORMAL FINDING ON URINALYSIS: ICD-10-CM

## 2024-12-24 DIAGNOSIS — Z23 IMMUNIZATION DUE: ICD-10-CM

## 2024-12-24 LAB
BILIRUBIN, URINE, POC: NEGATIVE
BLOOD URINE, POC: ABNORMAL
GLUCOSE URINE, POC: NEGATIVE
KETONES, URINE, POC: NEGATIVE
LEUKOCYTE ESTERASE, URINE, POC: NEGATIVE
NITRITE, URINE, POC: NEGATIVE
PH, URINE, POC: 8.5 (ref 4.6–8)
PROTEIN,URINE, POC: ABNORMAL
SPECIFIC GRAVITY, URINE, POC: 1.02 (ref 1–1.03)
URINALYSIS CLARITY, POC: CLEAR
URINALYSIS COLOR, POC: YELLOW
UROBILINOGEN, POC: ABNORMAL MG/DL

## 2024-12-24 RX ORDER — SODIUM FLUORIDE 1.1 G/100G
CREAM ORAL
COMMUNITY
Start: 2024-11-16

## 2024-12-24 ASSESSMENT — ENCOUNTER SYMPTOMS
COUGH: 0
SHORTNESS OF BREATH: 0
CHEST TIGHTNESS: 0
VOMITING: 0
NAUSEA: 0
ABDOMINAL PAIN: 0

## 2024-12-24 NOTE — PATIENT INSTRUCTIONS
vaccinated person leaves the clinic. If you see signs of a severe allergic reaction (hives, swelling of the face and throat, difficulty breathing, a fast heartbeat, dizziness, or weakness), call 9-1-1 and get the person to the nearest hospital.    For other signs that concern you, call your health care provider.    Adverse reactions should be reported to the Vaccine Adverse Event Reporting System (VAERS). Your health care provider will usually file this report, or you can do it yourself. Visit the VAERS website at www.vaers.[facility].gov or call 1-323.759.3786. VAERS is only for reporting reactions, and VAERS staff members do not give medical advice.    6. The National Vaccine Injury Compensation Program    The National Vaccine Injury Compensation Program (VICP) is a federal program that was created to compensate people who may have been injured by certain vaccines. Claims regarding alleged injury or death due to vaccination have a time limit for filing, which may be as short as two years. Visit the VICP website at www.[facility].gov/vaccinecompensation or call 1-375.316.7198 to learn about the program and about filing a claim.     7. How can I learn more?    Ask your health care provider.   Call your local or state health department.  Visit the website of the Food and Drug Administration (FDA) for vaccine package inserts and additional information at www.fda.gov/vaccines-blood-biologics/vaccines  Contact the Centers for Disease Control and Prevention (CDC):  Call 1-542.518.1959 (6-543-EMU-INFO) or  Visit CDC’s website at www.cdc.gov/vaccines.    Vaccine Information Statement (Interim)  Hepatitis B Vaccine   5/12/2023  42 U.S.C. § 300aa-26     Department of Health and Human Services  Centers for Disease Control and Prevention

## 2024-12-24 NOTE — PROGRESS NOTES
Deny Panchal (:  1995) is a 29 y.o. female, here for evaluation of the following medical concerns:  Chief Complaint   Patient presents with    Annual Exam         ASSESSMENT/PLAN:    1. Annual physical exam  2. Immunization due  3. Abnormal finding on urinalysis  -     AMB POC URINALYSIS DIP STICK AUTO W/ MICRO  Trace protein and blood otherwise normal- monitor  Results for orders placed or performed in visit on 24   AMB POC URINALYSIS DIP STICK AUTO W/ MICRO   Result Value Ref Range    Color (UA POC) Yellow     Clarity (UA POC) Clear     Glucose, Urine, POC Negative     Bilirubin, Urine, POC Negative     Ketones, Urine, POC Negative     Specific Gravity, Urine, POC 1.020 1.001 - 1.035    Blood (UA POC) Trace     pH, Urine, POC 8.5 (A) 4.6 - 8.0    Protein, Urine, POC Trace     Urobilinogen, POC 0.2 mg/dL <1.1 mg/dL    Nitrite, Urine, POC Negative Negative    Leukocyte Esterase, Urine, POC Negative           Return in about 1 year (around 2025) for [id number], ANNUAL PHYSICAL.        HPI  Annual physical today  Vaccines due- declines today  Hep b  Flu    Papsmear- declines today, schedule when ready to complete    Review of Systems   Constitutional:  Negative for diaphoresis and fatigue.   Respiratory:  Negative for cough, chest tightness and shortness of breath.    Cardiovascular:  Negative for chest pain, palpitations and leg swelling.   Gastrointestinal:  Negative for abdominal pain, nausea and vomiting.   Neurological:  Negative for dizziness, weakness and headaches.       Prior to Visit Medications    Medication Sig Taking? Authorizing Provider   DENTA 5000 PLUS 1.1 % CREA PLEASE SEE ATTACHED FOR DETAILED DIRECTIONS Yes Provider, Historical, MD        Social History     Tobacco Use    Smoking status: Former     Passive exposure: Never    Smokeless tobacco: Never   Substance Use Topics    Alcohol use: No        /74 (Site: Right Upper Arm)   Pulse 78   Temp 97.4 °F

## 2025-04-28 NOTE — PROGRESS NOTES
Have you been to the ER, urgent care clinic since your last visit?  Hospitalized since your last visit?       Have you seen or consulted any other health care providers outside our system since your last visit?        “Have you had a pap smear?”        No cervical cancer screening on file

## 2025-04-29 ENCOUNTER — TELEMEDICINE (OUTPATIENT)
Facility: CLINIC | Age: 30
End: 2025-04-29
Payer: COMMERCIAL

## 2025-04-29 DIAGNOSIS — Z02.89 ENCOUNTER FOR COMPLETION OF FORM WITH PATIENT: Primary | ICD-10-CM

## 2025-04-29 PROCEDURE — 99213 OFFICE O/P EST LOW 20 MIN: CPT | Performed by: NURSE PRACTITIONER

## 2025-04-29 SDOH — ECONOMIC STABILITY: FOOD INSECURITY: WITHIN THE PAST 12 MONTHS, THE FOOD YOU BOUGHT JUST DIDN'T LAST AND YOU DIDN'T HAVE MONEY TO GET MORE.: NEVER TRUE

## 2025-04-29 SDOH — ECONOMIC STABILITY: FOOD INSECURITY: WITHIN THE PAST 12 MONTHS, YOU WORRIED THAT YOUR FOOD WOULD RUN OUT BEFORE YOU GOT MONEY TO BUY MORE.: NEVER TRUE

## 2025-04-29 SDOH — ECONOMIC STABILITY: INCOME INSECURITY: IN THE LAST 12 MONTHS, WAS THERE A TIME WHEN YOU WERE NOT ABLE TO PAY THE MORTGAGE OR RENT ON TIME?: NO

## 2025-04-29 SDOH — ECONOMIC STABILITY: TRANSPORTATION INSECURITY
IN THE PAST 12 MONTHS, HAS THE LACK OF TRANSPORTATION KEPT YOU FROM MEDICAL APPOINTMENTS OR FROM GETTING MEDICATIONS?: NO

## 2025-04-29 ASSESSMENT — PATIENT HEALTH QUESTIONNAIRE - PHQ9
1. LITTLE INTEREST OR PLEASURE IN DOING THINGS: NOT AT ALL
SUM OF ALL RESPONSES TO PHQ QUESTIONS 1-9: 0
2. FEELING DOWN, DEPRESSED OR HOPELESS: NOT AT ALL
SUM OF ALL RESPONSES TO PHQ QUESTIONS 1-9: 0

## 2025-04-29 NOTE — PROGRESS NOTES
Deny Panchal, was evaluated through a synchronous (real-time) audio-video encounter. The patient (or guardian if applicable) is aware that this is a billable service, which includes applicable co-pays. This Virtual Visit was conducted with patient's (and/or legal guardian's) consent. Patient identification was verified, and a caregiver was present when appropriate.   The patient was located at Home: [address] [address]  Provider was located at Facility (Appt Dept): [address], [facility] [address]  [place] [address]  Confirm you are appropriately licensed, registered, or certified to deliver care in the [facility] where the patient is located as indicated above. If you are not or unsure, please re-schedule the visit: Yes, I confirm.     Deny Panchal (:  1995) is a Established patient, presenting virtually for evaluation of the following:      Below is the assessment and plan developed based on review of pertinent history, physical exam, labs, studies, and medications.     Assessment & Plan  Encounter for completion of form with patient              No follow-ups on file.     Assessment & Plan    Encounter for completion of form with patient    Results        Subjective   HPI  History of Present Illness      Her and  plan of getting into foster care and need paperwork completed. TB testing is necessary and discussed need to make appt to complete then once completed and assessed the paperwork can be completed and returned to them.       Review of Systems   Constitutional:  Negative for diaphoresis and fatigue.   Respiratory:  Negative for cough, chest tightness and shortness of breath.    Cardiovascular:  Negative for chest pain, palpitations and leg swelling.   Gastrointestinal:  Negative for abdominal pain, nausea and vomiting.   Neurological:  Negative for dizziness, weakness and headaches.          Objective   Patient-Reported Vitals  No data recorded     Physical

## 2025-05-01 ASSESSMENT — ENCOUNTER SYMPTOMS
ABDOMINAL PAIN: 0
NAUSEA: 0
CHEST TIGHTNESS: 0
COUGH: 0
SHORTNESS OF BREATH: 0
VOMITING: 0

## 2025-05-07 ENCOUNTER — TELEPHONE (OUTPATIENT)
Facility: CLINIC | Age: 30
End: 2025-05-07

## 2025-05-07 NOTE — TELEPHONE ENCOUNTER
Patient called stating needs assessment form signed off on states does not need TB test states form discuss during last virtual visit requesting call back from nurse.

## 2025-05-19 ENCOUNTER — TELEPHONE (OUTPATIENT)
Facility: CLINIC | Age: 30
End: 2025-05-19